# Patient Record
Sex: MALE | Race: WHITE | NOT HISPANIC OR LATINO
[De-identification: names, ages, dates, MRNs, and addresses within clinical notes are randomized per-mention and may not be internally consistent; named-entity substitution may affect disease eponyms.]

---

## 2024-03-12 ENCOUNTER — APPOINTMENT (OUTPATIENT)
Dept: UROLOGY | Facility: CLINIC | Age: 29
End: 2024-03-12
Payer: MEDICAID

## 2024-03-12 ENCOUNTER — NON-APPOINTMENT (OUTPATIENT)
Age: 29
End: 2024-03-12

## 2024-03-12 VITALS
TEMPERATURE: 97.5 F | BODY MASS INDEX: 29.99 KG/M2 | DIASTOLIC BLOOD PRESSURE: 84 MMHG | SYSTOLIC BLOOD PRESSURE: 127 MMHG | HEART RATE: 72 BPM | HEIGHT: 65 IN | WEIGHT: 180 LBS

## 2024-03-12 DIAGNOSIS — R10.2 PELVIC AND PERINEAL PAIN: ICD-10-CM

## 2024-03-12 PROBLEM — Z00.00 ENCOUNTER FOR PREVENTIVE HEALTH EXAMINATION: Status: ACTIVE | Noted: 2024-03-12

## 2024-03-12 PROCEDURE — 99204 OFFICE O/P NEW MOD 45 MIN: CPT

## 2024-03-12 NOTE — HISTORY OF PRESENT ILLNESS
[FreeTextEntry1] : carolyn yanez is a 28 year M presenting on 03/12/2024 No PMH: No meds  Referred by: [facility]?   Penile pain.  After ejaculation 2 months ago put pressure on penis and felt small snap. Minor bruising. Doesn't remember if erection immediately lost. Noticed a drop of blood out of urethra that day. Now has white spot on head of penis. Now couple hours after ejaculation feels 6-8/10 pain. Also may have randomly 4-5/10 pain.  Denies any penile curve or hard areas under the skin. Went to urologist  Dr Lujan 3/1/24. RX for terazosin. Didn't try. Did scrotal US 3/7/24 at [facility]. Did not visualize shaft, only testes and told ok.  Single. No children Quit cigarettes 2-3/daily 2 months ago Smokes marijuana No f/h CA

## 2024-03-12 NOTE — PHYSICAL EXAM
[Normal Appearance] : normal appearance [Well Groomed] : well groomed [General Appearance - In No Acute Distress] : no acute distress [Respiration, Rhythm And Depth] : normal respiratory rhythm and effort [Exaggerated Use Of Accessory Muscles For Inspiration] : no accessory muscle use [Urethral Meatus] : meatus normal [Penis Abnormality] : normal uncircumcised penis [Scrotum] : the scrotum was normal [Testes Tenderness] : no tenderness of the testes [Testes Mass (___cm)] : there were no testicular masses [Normal Station and Gait] : the gait and station were normal for the patient's age [] : no rash [Oriented To Time, Place, And Person] : oriented to person, place, and time [No Focal Deficits] : no focal deficits [Affect] : the affect was normal [Mood] : the mood was normal [de-identified] : B/L 16cc testes, no varicoceles

## 2024-03-12 NOTE — ASSESSMENT
[FreeTextEntry1] : 27yo M CPPS no evidence penile fracutre -sitz baths nightly -OTC pain meds -consider alfuzosin if no relief from sitz baths after a few weeks. -UA, Ucx -F/U 6 weeks

## 2024-03-13 ENCOUNTER — EMERGENCY (EMERGENCY)
Facility: HOSPITAL | Age: 29
LOS: 1 days | Discharge: HOME | End: 2024-03-13
Attending: STUDENT IN AN ORGANIZED HEALTH CARE EDUCATION/TRAINING PROGRAM | Admitting: STUDENT IN AN ORGANIZED HEALTH CARE EDUCATION/TRAINING PROGRAM
Payer: MEDICAID

## 2024-03-13 VITALS
OXYGEN SATURATION: 98 % | HEART RATE: 76 BPM | RESPIRATION RATE: 20 BRPM | WEIGHT: 184.97 LBS | TEMPERATURE: 98 F | DIASTOLIC BLOOD PRESSURE: 77 MMHG | HEIGHT: 66 IN | SYSTOLIC BLOOD PRESSURE: 129 MMHG

## 2024-03-13 VITALS
RESPIRATION RATE: 19 BRPM | SYSTOLIC BLOOD PRESSURE: 137 MMHG | TEMPERATURE: 98 F | HEART RATE: 73 BPM | OXYGEN SATURATION: 99 % | DIASTOLIC BLOOD PRESSURE: 74 MMHG
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
## 2024-03-13 DIAGNOSIS — N48.89 OTHER SPECIFIED DISORDERS OF PENIS: ICD-10-CM
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
## 2024-03-13 LAB
APPEARANCE UR: CLEAR — SIGNIFICANT CHANGE UP
APPEARANCE: CLEAR
BILIRUB UR-MCNC: NEGATIVE — SIGNIFICANT CHANGE UP
BILIRUBIN URINE: NEGATIVE
BLOOD URINE: NEGATIVE
COLOR SPEC: YELLOW — SIGNIFICANT CHANGE UP
COLOR: YELLOW
DIFF PNL FLD: NEGATIVE — SIGNIFICANT CHANGE UP
GLUCOSE QUALITATIVE U: NEGATIVE MG/DL
GLUCOSE UR QL: NEGATIVE MG/DL — SIGNIFICANT CHANGE UP
KETONES UR-MCNC: NEGATIVE MG/DL — SIGNIFICANT CHANGE UP
KETONES URINE: NEGATIVE MG/DL
LEUKOCYTE ESTERASE UR-ACNC: NEGATIVE — SIGNIFICANT CHANGE UP
LEUKOCYTE ESTERASE URINE: NEGATIVE
NITRITE UR-MCNC: NEGATIVE — SIGNIFICANT CHANGE UP
NITRITE URINE: NEGATIVE
PH UR: 6 — SIGNIFICANT CHANGE UP (ref 5–8)
PH URINE: 6.5
PROT UR-MCNC: NEGATIVE MG/DL — SIGNIFICANT CHANGE UP
PROTEIN URINE: NEGATIVE MG/DL
SP GR SPEC: 1.02 — SIGNIFICANT CHANGE UP (ref 1–1.03)
SPECIFIC GRAVITY URINE: 1.01
UROBILINOGEN FLD QL: 1 MG/DL — SIGNIFICANT CHANGE UP (ref 0.2–1)
UROBILINOGEN URINE: 0.2 MG/DL

## 2024-03-13 PROCEDURE — 99285 EMERGENCY DEPT VISIT HI MDM: CPT

## 2024-03-13 PROCEDURE — 99283 EMERGENCY DEPT VISIT LOW MDM: CPT

## 2024-03-13 PROCEDURE — 87491 CHLMYD TRACH DNA AMP PROBE: CPT

## 2024-03-13 PROCEDURE — 87591 N.GONORRHOEAE DNA AMP PROB: CPT

## 2024-03-13 PROCEDURE — 99284 EMERGENCY DEPT VISIT MOD MDM: CPT | Mod: 25

## 2024-03-13 PROCEDURE — 81003 URINALYSIS AUTO W/O SCOPE: CPT

## 2024-03-13 PROCEDURE — 93980 PENILE VASCULAR STUDY: CPT | Mod: 26

## 2024-03-13 PROCEDURE — 93980 PENILE VASCULAR STUDY: CPT

## 2024-03-13 NOTE — ED ADULT NURSE NOTE - NSFALLUNIVINTERV_ED_ALL_ED
Bed/Stretcher in lowest position, wheels locked, appropriate side rails in place/Call bell, personal items and telephone in reach/Instruct patient to call for assistance before getting out of bed/chair/stretcher/Non-slip footwear applied when patient is off stretcher/[place] to call system/Physically safe environment - no spills, clutter or unnecessary equipment/Purposeful proactive rounding/Room/bathroom lighting operational, light cord in reach

## 2024-03-13 NOTE — ED PROVIDER NOTE - PHYSICAL EXAMINATION
Vitals reviewed  Gen: anxious appearing, nad, speaking in full sentences  Skin: wwp, no rash/lesions  HEENT: ncat, eomi, mmm, airway patent   CV: rrr, no audible m/r/g  Resp: symmetrical expansion, ctab, no w/r/r  Abd: nondistended, soft/nt, no r/g   exam by urology (pt preference)   Ext: FROM throughout, no peripheral edema, no muscle or joint ttp, distal pulses 2+  Neuro: alert/oriented, no focal deficits, steady gait

## 2024-03-13 NOTE — ED PROVIDER NOTE - PATIENT PORTAL LINK FT
You can access the FollowMyHealth Patient Portal offered by [facility] by registering at the following website: [email or web address]. By joining [facility]’s FollowMyHealth portal, you will also be able to view your health information using other applications (apps) compatible with our system.

## 2024-03-13 NOTE — ED ADULT NURSE NOTE - IS THE PATIENT ABLE TO BE SCREENED?
Endometrial biopsy    Visit Vitals  /66 (BP Location: LUE - Left upper extremity, Patient Position: Sitting, Cuff Size: Large Adult)   Pulse 80   Wt 100.7 kg (221 lb 14.3 oz)   LMP  (LMP Unknown)   SpO2 97%   BMI 40.58 kg/m²     Patient's vital signs were reviewed during today's visit. Timeout was performed prior to beginning procedure.    HPI: Thickened endometrium by ultrasound    The patient was consented for the procedure verbally and via written consent.  Timeout was performed before the procedure began. The risks/benefits/alternatives of endometrial biopsy were discussed including but not limited to infection, bleeding, cramping, post-procedure spotting/cramping and insufficient tissue for evaluation necessitating a subsequent procedure to obtain sample.  Under sterile conditions, a speculum was placed into the vagina and the cervix brought into view. The cervix was then prepped with betadine. A single toothed tenaculum was applied to the anterior lip of the cervix. A cervical dilator was used to dilate the cervix. A pipelle was inserted.  Three passes were performed. A small mount of tissue was obtained.  The tissue was sent to pathology.  All instruments were removed from the vagina. The patient tolerated the procedure well.    Assessment  Problem List Items Addressed This Visit    None     Visit Diagnoses     Post-menopausal bleeding    -  Primary          Plan  Pending pathology results    Yes

## 2024-03-13 NOTE — CONSULT NOTE ADULT - PROBLEM SELECTOR RECOMMENDATION 9
-no emergent  surgical intervention indicated at this time  -unlikely to be fracture given time of duration and persistent ability to maintain erection with ejaculation since onset of potentially injury  -can obtain a penile u/s for further evaluation in suspicion of Peyronie's disease  -sitz baths nightly  -OTC pain meds  -consider alfuzosin if no relief from sitz baths after a few weeks.  -UA, Ucx  -F/U outpatient with Dr Mayorga for further evaluation and treatment

## 2024-03-13 NOTE — ED PROVIDER NOTE - ATTENDING APP SHARED VISIT CONTRIBUTION OF CARE
28 M denies pmh p/w penile pain x 2 mons.  pt reports accidently applied pressure to penis s/p ejaculation and felt snap, since has had bruising and discomfort in penis since; worse s/p ejaculation. will obtain us, anticipate uro fu

## 2024-03-13 NOTE — ED ADULT TRIAGE NOTE - CHIEF COMPLAINT QUOTE
Pt presents to ED here for penile pain x 2 months sent in by Dr Mayorga for US. Denies any discharges or urinary sx. Pt A&Ox4, conversive in full sentences and ambulatory.

## 2024-03-13 NOTE — CONSULT NOTE ADULT - SUBJECTIVE AND OBJECTIVE BOX
Patient is a 28y old  Male who presents with a chief complaint of penile pain    HPI: 28 year old male with no pertinent past medical history presenting to the ED with penile pain. Patient was seen in office by Dr Mayorga yesterday 03/12/2024. After ejaculation 2 months ago put pressure on penis and felt small snap. Minor bruising. Doesn't remember if erection immediately lost. Noticed a drop of blood out of urethra that day. Now has white spot on head of penis.  Now couple hours after ejaculation feels 6-8/10 pain. Also may have randomly 4-5/10 pain. Reports to still having erections and being able to ejaculate since that time.  Initially denies penile curve during in office visit yesterday however after further evaluating his penis during erect state reports curvature to the right. Unsure how long the curvature present as he states he doesnt often inspect his penis and masturbates using objects such as pillows. He denies hard areas under the skin.  Went to urologist Dr Lujan 3/1/24. RX for terazosin. Didn't try.  Did scrotal US 3/7/24 at [facility]. Did not visualize shaft, only testes and told ok.    Vital Signs Last 24 Hrs  T(C): 36.7 (13 Mar 2024 12:14), Max: 36.7 (13 Mar 2024 12:14)  T(F): 98.1 (13 Mar 2024 12:14), Max: 98.1 (13 Mar 2024 12:14)  HR: 76 (13 Mar 2024 12:14) (76 - 76)  BP: 129/77 (13 Mar 2024 12:14) (129/77 - 129/77)  BP(mean): --  RR: 20 (13 Mar 2024 12:14) (20 - 20)  SpO2: 98% (13 Mar 2024 12:14) (98% - 98%)    Parameters below as of 13 Mar 2024 12:14  Patient On (Oxygen Delivery Method): room air      I&O's Summary      PE:  Gen: NAD  Abd: soft, nt, nd  : circumcised phallus, no bruising, erythema, lesions appreciated, phallus nontender to palpation, no masses or deformities appreciated, no curvature during flaccid state.

## 2024-03-13 NOTE — ED ADULT NURSE NOTE - OBJECTIVE STATEMENT
Pt A&Ox4 and able to speak in complete sentences. Pt breathing even and unlabored with equal chest rise and fall. Pt arrived due to penile pain over the past two months. Pt referred by MD for US. Pt denies cp, n, v, lightheadedness, dizziness, discharge, hematuria.

## 2024-03-13 NOTE — ED PROVIDER NOTE - CLINICAL SUMMARY MEDICAL DECISION MAKING FREE TEXT BOX
28 M denies pmh p/w penile pain x 2 mons, injury 2 mons ago.  seen by  uro Dr. Mayorga outpt and noted new curvature yesterday so came to ED.  pt anxious but comfortable appearing, abd soft/nt,  exam by uro pt request and reported normal.  will obtain penile sonogram although unlikely fx as already eval by uro outpt, able to obtain erection and no pain at this time.

## 2024-03-13 NOTE — ED PROVIDER NOTE - NSFOLLOWUPINSTRUCTIONS_ED_ALL_ED_FT
Continue sitz baths nightly.   Please rest and remain well hydrated with plenty of fluids.  You can take motrin 600-800mg and tylenol 650mg every 3 hours, switching between the two for pain/body      Follow up with urologist as scheduled

## 2024-03-13 NOTE — ED PROVIDER NOTE - OBJECTIVE STATEMENT
28 M denies pmh p/w penile pain x 2 mons.  pt reports accidently applied pressure to penis s/p ejaculation and felt snap, since has had bruising and discomfort in penis since; worse s/p ejaculation.  had outpt sono 3/7 which did not visualize shaft, but told ok.  saw uro Dr. Hyman yesterday and recommend sitz bath and f/u.  noted R curvature during erection after sitz bath yesterday and told any curve was concerning; called office and instructed to come to ED.  denies f/c, HA, dizziness, chest pain, sob, abd pain, nvd, dysuria, hematuria, difficulty urinating, blood w/ ejaculation, recurrent trauma.

## 2024-03-14 LAB
BACTERIA UR CULT: NORMAL
C TRACH RRNA SPEC QL NAA+PROBE: SIGNIFICANT CHANGE UP
N GONORRHOEA RRNA SPEC QL NAA+PROBE: SIGNIFICANT CHANGE UP
SPECIMEN SOURCE: SIGNIFICANT CHANGE UP

## 2024-04-16 ENCOUNTER — APPOINTMENT (OUTPATIENT)
Dept: UROLOGY | Facility: CLINIC | Age: 29
End: 2024-04-16
Payer: MEDICAID

## 2024-04-16 VITALS
BODY MASS INDEX: 29.99 KG/M2 | HEART RATE: 101 BPM | HEIGHT: 65 IN | DIASTOLIC BLOOD PRESSURE: 78 MMHG | TEMPERATURE: 97.4 F | WEIGHT: 180 LBS | SYSTOLIC BLOOD PRESSURE: 124 MMHG

## 2024-04-16 PROBLEM — Z78.9 OTHER SPECIFIED HEALTH STATUS: Chronic | Status: ACTIVE | Noted: 2024-03-13

## 2024-04-16 PROCEDURE — 99213 OFFICE O/P EST LOW 20 MIN: CPT

## 2024-04-16 NOTE — ASSESSMENT
[FreeTextEntry1] : chronic pelvic pain reassured cont sitz baths will hold off on medication for now f/u 6months

## 2024-04-16 NOTE — HISTORY OF PRESENT ILLNESS
[FreeTextEntry1] : returns for follwoup now doing sitz baths pain has improved signficationly mild persistent pain after ejaculation UA UCX negative

## 2024-10-15 ENCOUNTER — APPOINTMENT (OUTPATIENT)
Dept: UROLOGY | Facility: CLINIC | Age: 29
End: 2024-10-15